# Patient Record
Sex: FEMALE | URBAN - METROPOLITAN AREA
[De-identification: names, ages, dates, MRNs, and addresses within clinical notes are randomized per-mention and may not be internally consistent; named-entity substitution may affect disease eponyms.]

---

## 2022-08-30 ENCOUNTER — APPOINTMENT (RX ONLY)
Dept: URBAN - METROPOLITAN AREA CLINIC 87 | Facility: CLINIC | Age: 67
Setting detail: DERMATOLOGY
End: 2022-08-30

## 2022-08-30 PROBLEM — C43.9 MALIGNANT MELANOMA OF SKIN, UNSPECIFIED: Status: ACTIVE | Noted: 2022-08-30

## 2022-08-30 PROCEDURE — ? COUNSELING

## 2022-08-30 PROCEDURE — ? ADDITIONAL NOTES

## 2022-08-30 PROCEDURE — 99203 OFFICE O/P NEW LOW 30 MIN: CPT

## 2022-08-30 PROCEDURE — ? TREATMENT REGIMEN

## 2022-08-30 NOTE — PROCEDURE: TREATMENT REGIMEN
Detail Level: Zone
Plan: Patient presents with biopsy proven superficial spreading invasive melanoma of right upper arm done on 8/16/22 in [place]. Patient currently in [place] with her daughter and would like to be treated here. Picture of biopsy site taken today which was taken from a larger lesion. Reviewed with patient we will refer her to Dr. Tomasz Silva Surgical Oncologist for treatment. Case reviewed with Dr. Rowe. Recommended full body skin checks every 3 months as well as ophthalmology evaluation and dental exams every 6 months. Earlene Jefferson PA-C information given as patient plans to be back in [place] in 3 months

## 2022-08-30 NOTE — HPI: SKIN LESION (MALIGNANT MELANOMA)
How Severe Is Your Melanoma?: severe
Is This A New Presentation, Or A Follow-Up?: Melanoma
What Stage Is The Melanoma?: other

## 2022-08-30 NOTE — PROCEDURE: ADDITIONAL NOTES
Detail Level: Simple
Additional Notes: Will refer to Dr. Silva . All records will be sent over.
Render Risk Assessment In Note?: no

## 2022-10-13 ENCOUNTER — APPOINTMENT (RX ONLY)
Dept: URBAN - METROPOLITAN AREA CLINIC 87 | Facility: CLINIC | Age: 67
Setting detail: DERMATOLOGY
End: 2022-10-13

## 2022-10-13 DIAGNOSIS — L71.0 PERIORAL DERMATITIS: ICD-10-CM

## 2022-10-13 DIAGNOSIS — L73.8 OTHER SPECIFIED FOLLICULAR DISORDERS: ICD-10-CM

## 2022-10-13 DIAGNOSIS — Z85.820 PERSONAL HISTORY OF MALIGNANT MELANOMA OF SKIN: ICD-10-CM

## 2022-10-13 PROCEDURE — ? ADDITIONAL NOTES

## 2022-10-13 PROCEDURE — 99213 OFFICE O/P EST LOW 20 MIN: CPT

## 2022-10-13 PROCEDURE — ? PRESCRIPTION

## 2022-10-13 PROCEDURE — ? COUNSELING

## 2022-10-13 PROCEDURE — ? TREATMENT REGIMEN

## 2022-10-13 RX ORDER — DOXYCYCLINE HYCLATE 100 MG/1
CAPSULE, GELATIN COATED ORAL
Qty: 60 | Refills: 1 | Status: ERX | COMMUNITY
Start: 2022-10-13

## 2022-10-13 RX ORDER — PIMECROLIMUS 10 MG/G
CREAM TOPICAL BID
Qty: 60 | Refills: 1 | Status: ERX | COMMUNITY
Start: 2022-10-13

## 2022-10-13 RX ADMIN — PIMECROLIMUS: 10 CREAM TOPICAL at 00:00

## 2022-10-13 RX ADMIN — DOXYCYCLINE HYCLATE: 100 CAPSULE, GELATIN COATED ORAL at 00:00

## 2022-10-13 ASSESSMENT — LOCATION DETAILED DESCRIPTION DERM
LOCATION DETAILED: RIGHT INFERIOR MEDIAL MALAR CHEEK
LOCATION DETAILED: RIGHT ANTERIOR PROXIMAL UPPER ARM
LOCATION DETAILED: RIGHT MEDIAL BREAST 2-3:00 REGION

## 2022-10-13 ASSESSMENT — LOCATION ZONE DERM
LOCATION ZONE: ARM
LOCATION ZONE: FACE
LOCATION ZONE: TRUNK

## 2022-10-13 ASSESSMENT — LOCATION SIMPLE DESCRIPTION DERM
LOCATION SIMPLE: RIGHT UPPER ARM
LOCATION SIMPLE: RIGHT BREAST
LOCATION SIMPLE: RIGHT CHEEK

## 2022-10-13 NOTE — PROCEDURE: ADDITIONAL NOTES
Additional Notes: Comedonal Lesions located on the right medial breast 2-3:00 region were removed with an 11 blade and comedo extractor.  Prior to removal the treatment areas were prepped in the usual fashion.  Verbal consent was obtained from mother and risks were reviewed including but not limited to scarring, infection, bleeding, scabbing, incomplete removal, and allergy to anesthesia.
Detail Level: Simple
Render Risk Assessment In Note?: no
Additional Notes: Treated by Dr. Silva here in [place]. Patient will follow up for skin check in [place]

## 2022-10-13 NOTE — PROCEDURE: TREATMENT REGIMEN
Detail Level: Zone
Plan: Recommend stopping all facial cosmetics and topical products. Avoid occlusive creams\\nWash with gentle non-soap cleanser and water
Initiate Treatment: doxycycline hyclate 100 mg capsule Take 1 PO BID with food and a full glass of water and food\\n\\nElidel 1 % topical cream BID Apply twice daily to affected areas around nose x 4 weeks
Discontinue Regimen: Hydrogen peroxide

## 2022-11-04 ENCOUNTER — RX ONLY (OUTPATIENT)
Age: 67
Setting detail: RX ONLY
End: 2022-11-04

## 2022-11-04 RX ORDER — DOXYCYCLINE HYCLATE 100 MG/1
CAPSULE, GELATIN COATED ORAL
Qty: 60 | Refills: 1 | Status: ERX | COMMUNITY
Start: 2022-11-04